# Patient Record
Sex: MALE
[De-identification: names, ages, dates, MRNs, and addresses within clinical notes are randomized per-mention and may not be internally consistent; named-entity substitution may affect disease eponyms.]

---

## 2018-09-11 ENCOUNTER — HOSPITAL ENCOUNTER (INPATIENT)
Dept: HOSPITAL 31 - C.ER | Age: 83
LOS: 3 days | Discharge: HOME | DRG: 639 | End: 2018-09-14
Attending: INTERNAL MEDICINE | Admitting: INTERNAL MEDICINE
Payer: COMMERCIAL

## 2018-09-11 VITALS — BODY MASS INDEX: 25.5 KG/M2

## 2018-09-11 DIAGNOSIS — Z87.891: ICD-10-CM

## 2018-09-11 DIAGNOSIS — I10: ICD-10-CM

## 2018-09-11 DIAGNOSIS — E11.641: Primary | ICD-10-CM

## 2018-09-11 DIAGNOSIS — Z98.890: ICD-10-CM

## 2018-09-11 DIAGNOSIS — N40.0: ICD-10-CM

## 2018-09-11 DIAGNOSIS — Z79.4: ICD-10-CM

## 2018-09-11 DIAGNOSIS — I44.0: ICD-10-CM

## 2018-09-11 LAB
ALBUMIN SERPL-MCNC: 3.7 G/DL (ref 3.5–5)
ALBUMIN/GLOB SERPL: 1.3 {RATIO} (ref 1–2.1)
ALT SERPL-CCNC: 23 U/L (ref 21–72)
APTT BLD: 30 SECONDS (ref 21–34)
AST SERPL-CCNC: 16 U/L (ref 17–59)
BASE EXCESS BLDV CALC-SCNC: 1.9 MMOL/L (ref 0–2)
BASOPHILS # BLD AUTO: 0.1 K/UL (ref 0–0.2)
BASOPHILS NFR BLD: 0.7 % (ref 0–2)
BILIRUB UR-MCNC: NEGATIVE MG/DL
BNP SERPL-MCNC: 640 PG/ML (ref 0–900)
BUN SERPL-MCNC: 20 MG/DL (ref 9–20)
CALCIUM SERPL-MCNC: 9 MG/DL (ref 8.6–10.4)
CK MB SERPL-MCNC: 1.67 NG/ML (ref 0–3.38)
EOSINOPHIL # BLD AUTO: 0.2 K/UL (ref 0–0.7)
EOSINOPHIL NFR BLD: 1.4 % (ref 0–4)
ERYTHROCYTE [DISTWIDTH] IN BLOOD BY AUTOMATED COUNT: 14.8 % (ref 11.5–14.5)
GFR NON-AFRICAN AMERICAN: > 60
GLUCOSE UR STRIP-MCNC: (no result) MG/DL
HGB BLD-MCNC: 10.4 G/DL (ref 12–18)
INR PPP: 1
LEUKOCYTE ESTERASE UR-ACNC: (no result) LEU/UL
LYMPHOCYTES # BLD AUTO: 1.4 K/UL (ref 1–4.3)
LYMPHOCYTES NFR BLD AUTO: 11.3 % (ref 20–40)
MCH RBC QN AUTO: 29.4 PG (ref 27–31)
MCHC RBC AUTO-ENTMCNC: 33.2 G/DL (ref 33–37)
MCV RBC AUTO: 88.5 FL (ref 80–94)
MONOCYTES # BLD: 1 K/UL (ref 0–0.8)
MONOCYTES NFR BLD: 8 % (ref 0–10)
NEUTROPHILS # BLD: 10.1 K/UL (ref 1.8–7)
NEUTROPHILS NFR BLD AUTO: 78.6 % (ref 50–75)
NRBC BLD AUTO-RTO: 0 % (ref 0–2)
PCO2 BLDV: 55 MMHG (ref 40–60)
PH BLDV: 7.33 [PH] (ref 7.32–7.43)
PH UR STRIP: 5 [PH] (ref 5–8)
PLATELET # BLD: 260 K/UL (ref 130–400)
PMV BLD AUTO: 8.5 FL (ref 7.2–11.7)
PROT UR STRIP-MCNC: NEGATIVE MG/DL
PROTHROMBIN TIME: 11.4 SECONDS (ref 9.7–12.2)
RBC # BLD AUTO: 3.53 MIL/UL (ref 4.4–5.9)
RBC # UR STRIP: NEGATIVE /UL
SP GR UR STRIP: 1.01 (ref 1–1.03)
SQUAMOUS EPITHIAL: < 1 /HPF (ref 0–5)
TROPONIN I SERPL-MCNC: 0.02 NG/ML (ref 0–0.12)
UROBILINOGEN UR-MCNC: NORMAL MG/DL (ref 0.2–1)
VENOUS BLOOD GAS PO2: 23 MM/HG (ref 30–55)
WBC # BLD AUTO: 12.8 K/UL (ref 4.8–10.8)

## 2018-09-11 NOTE — C.PDOC
History Of Present Illness


86 y/o male, w/PMhx of HTN, diabetes, and recent left hip fracture s/p ORIF,  

BIBA for evaluation of a syncopal episode developed PTA. As per family members, 

patient woke up in the morning and was complaining of feeling weak. Pt went to 

do some activities when he sustained a syncopal episode. On arrival of ambulance

, patient had a fingerstick less than 30. Presently, patient denies having 

active physical complaints. Patient appears awake, slightly confused, and not 

in any apparent disease.


Time Seen by Provider: 09/11/18 12:14


Chief Complaint (Nursing): Altered Mental Status


History Per: Patient, Family


History/Exam Limitations: None


Onset/Duration Of Symptoms: Days


Current Symptoms Are (Timing): Still Present





Past Medical History


Reviewed: Historical Data, Nursing Documentation, Vital Signs


Vital Signs: 


 Last Vital Signs











Temp  98 F   09/13/18 05:57


 


Pulse  57 L  09/13/18 05:57


 


Resp  20   09/13/18 05:57


 


BP  142/57 L  09/13/18 05:57


 


Pulse Ox  96   09/13/18 05:57














- Medical History


PMH: HTN (AS PER PATIENT'S SON)


Other Surgeries: Hx of surgeries


Family History: States: No Known Family Hx





- Social History


Hx Alcohol Use: No


Hx Substance Use: No





- Immunization History


Hx Tetanus Toxoid Vaccination: No


Hx Influenza Vaccination: No


Hx Pneumococcal Vaccination: No





Review Of Systems


Except As Marked, All Systems Reviewed And Found Negative.


Constitutional: Negative for: Fever, Chills


Neurological: Positive for: Other (syncope).  Negative for: Weakness, Numbness





Physical Exam





- Physical Exam


Appears: No Acute Distress, Other (awake, slightly confused)


Skin: Normal Color, Warm, Dry


Head: Atraumatic, Normacephalic


Eye(s): bilateral: Normal Inspection


Nose: Normal


Oral Mucosa: Moist


Neck: Supple


Chest: Symmetrical


Cardiovascular: Rhythm Regular, Murmur (pansystolic murmur 3/6)


Respiratory: Normal Breath Sounds, No Rales, No Rhonchi, No Wheezing


Gastrointestinal/Abdominal: Normal Exam, Soft, No Tenderness, No Guarding, No 

Rebound


Extremity: Normal ROM, Other (left lower leg - non pitting edema)


Neurological/Psych: Oriented x3, Normal Speech





ED Course And Treatment





- Laboratory Results


Result Diagrams: 


 09/12/18 06:18





 09/12/18 06:16


ECG: Interpreted By Me, Viewed By Me


ECG Interpretation: Normal


Interpretation Of ECG: Sinus rhythm with 1st degree AV block.  Otherwise normal 

ECG


O2 Sat by Pulse Oximetry: 100 (RA)


Pulse Ox Interpretation: Normal





- Radiology


CXR: Interpreted by Me, Viewed By Me





- CT Scan/US


  ** Ct head w/o contrast


Other Rad Studies (CT/US): Radiology Report Reviewed


CT/US Interpretation: IMPRESSION:  No acute intracranial hemorrhage.  Moderate 

patchy deep and subcortical white matter ischemic changes both cerebral 

hemispheres.  There also mild diffuse/confluent chronic periventricular white 

matter ischemic changes with scattered chronic bilateral basal nuclei lacunar 

type infarcts.  Moderate generalized volume loss.  .





  ** Doppler B/L


Other Rad Studies (CT/US): Radiology Report Reviewed


CT/US Interpretation: (-) DVT of B/L lEs


Progress Note: Pt remained stable during [facility] ED evaluation.  case discussed 

with  and admission rranged to tele with Dx: Syncope





Disposition





- Disposition


Disposition: HOSPITALIZED


Disposition Time: 14:01


Condition: STABLE





- Clinical Impression


Clinical Impression: 


 Syncope








- PA / NP / Resident Statement


MD/DO has reviewed & agrees with the documentation as recorded.





- Scribe Statement


The provider has reviewed the documentation as recorded by the Jorge Looney





Provider Attestation





All medical record entries made by the Scribe were at my direction and 

personally dictated by me. I have reviewed the chart and agree that the record 

accurately reflects my personal performance of the history, physical exam, 

medical decision making, and the department course for this patient. I have 

also personally directed, reviewed, and agree with the discharge instructions 

and disposition.

## 2018-09-11 NOTE — RAD
Date of service: 



09/11/2018



HISTORY:

Sepsis Patient  



COMPARISON:

No prior. 



FINDINGS:



LUNGS:

No consolidation.



Trace linear bandlike faint opacities left hemidiaphragm/left lung 

base - subsegmental discoid atelectasis -and or scarring/fibrotic 

changes here considerations-chronicity unknown.



PLEURA:

No significant pleural effusion identified, no pneumothorax apparent. 

Additional findings as referenced above regarding the left patricio 

diaphragmatic pleural surface. 



CARDIOVASCULAR:

Probable minimal cardiomegaly. Tortuous thoracic aorta. 

Atherosclerotic vascular calcifications present. 



OSSEOUS STRUCTURES:

Bilateral shoulder arthrosis



VISUALIZED UPPER ABDOMEN:

Normal.



OTHER FINDINGS:

None.



IMPRESSION:

No consolidation.



 Mostly thin bandlike but also trace radiolucent patchy faint opacity 

over left hemidiaphragm - combination subsegmental atelectasis/trace 

linear fibrosis and post inflammatory like changes here are favored. 

No dense air bronchograms consolidation seen. A subtle infiltrate 

cannot be entirely excluded here



Follow-up recommended

## 2018-09-11 NOTE — CP.PCM.HP
Past Patient History





- Past Social History


Smoking Status: Never Smoked





- CARDIAC


Hx Hypertension: Yes (AS PER PATIENT'S SON)





- ENDOCRINE/METABOLIC


Hx Diabetes Mellitus Type 2: Yes (AS PER PATIENT AND SON)





- PSYCHIATRIC


Hx Substance Use: No





- SURGICAL HISTORY


Hx Surgeries: Yes


Hx Orthopedic Surgery: Yes (RIGHT HIP SURGERY 6 WEEKS AGO AS PER PATIENT'S SON)





- ANESTHESIA


Hx Anesthesia: Yes





Meds


Allergies/Adverse Reactions: 


 Allergies











Allergy/AdvReac Type Severity Reaction Status Date / Time


 


No Known Allergies Allergy   Unverified 09/11/18 11:55














Results





- Vital Signs


Recent Vital Signs: 





 Last Vital Signs











Temp  97.6 F   09/11/18 14:46


 


Pulse  67   09/11/18 14:46


 


Resp  18   09/11/18 14:46


 


BP  127/56 L  09/11/18 14:46


 


Pulse Ox  100   09/11/18 15:24














- Labs


Result Diagrams: 


 09/11/18 13:12





 09/11/18 13:12


Labs: 





 Laboratory Results - last 24 hr











  09/11/18 09/11/18 09/11/18





  11:48 13:12 13:12


 


WBC   12.8 H 


 


RBC   3.53 L 


 


Hgb   10.4 L 


 


Hct   31.2 L 


 


MCV   88.5 


 


MCH   29.4 


 


MCHC   33.2 


 


RDW   14.8 H 


 


Plt Count   260 


 


MPV   8.5 


 


Neut % (Auto)   78.6 H 


 


Lymph % (Auto)   11.3 L 


 


Mono % (Auto)   8.0 


 


Eos % (Auto)   1.4 


 


Baso % (Auto)   0.7 


 


Neut # (Auto)   10.1 H 


 


Lymph # (Auto)   1.4 


 


Mono # (Auto)   1.0 H 


 


Eos # (Auto)   0.2 


 


Baso # (Auto)   0.1 


 


PT    11.4


 


INR    1.0


 


APTT    30


 


pO2   


 


VBG pH   


 


VBG pCO2   


 


VBG HCO3   


 


VBG Total CO2   


 


VBG O2 Sat (Calc)   


 


VBG Base Excess   


 


VBG Potassium   


 


Glucose   


 


Lactate   


 


Sodium   


 


Potassium   


 


Chloride   


 


Carbon Dioxide   


 


Anion Gap   


 


BUN   


 


Creatinine   


 


Est GFR ( Amer)   


 


Est GFR (Non-Af Amer)   


 


POC Glucose (mg/dL)  108  


 


Random Glucose   


 


Calcium   


 


Phosphorus   


 


Magnesium   


 


Total Bilirubin   


 


AST   


 


ALT   


 


Alkaline Phosphatase   


 


Total Creatine Kinase   


 


Troponin I   


 


NT-Pro-B Natriuret Pep   


 


Total Protein   


 


Albumin   


 


Globulin   


 


Albumin/Globulin Ratio   


 


Venous Blood Potassium   


 


Urine Color   


 


Urine Clarity   


 


Urine pH   


 


Ur Specific Gravity   


 


Urine Protein   


 


Urine Glucose (UA)   


 


Urine Ketones   


 


Urine Blood   


 


Urine Nitrate   


 


Urine Bilirubin   


 


Urine Urobilinogen   


 


Ur Leukocyte Esterase   


 


Urine WBC (Auto)   


 


Ur Squamous Epith Cells   














  09/11/18 09/11/18 09/11/18





  13:12 13:21 15:01


 


WBC   


 


RBC   


 


Hgb   


 


Hct   


 


MCV   


 


MCH   


 


MCHC   


 


RDW   


 


Plt Count   


 


MPV   


 


Neut % (Auto)   


 


Lymph % (Auto)   


 


Mono % (Auto)   


 


Eos % (Auto)   


 


Baso % (Auto)   


 


Neut # (Auto)   


 


Lymph # (Auto)   


 


Mono # (Auto)   


 


Eos # (Auto)   


 


Baso # (Auto)   


 


PT   


 


INR   


 


APTT   


 


pO2   23 L 


 


VBG pH   7.33 


 


VBG pCO2   55 


 


VBG HCO3   24.7 


 


VBG Total CO2   30.7 H 


 


VBG O2 Sat (Calc)   38.6 L 


 


VBG Base Excess   1.9 


 


VBG Potassium   3.8 


 


Glucose   76 


 


Lactate   2.3 H 


 


Sodium  143  139.0 


 


Potassium  4.1  


 


Chloride  101  104.0 


 


Carbon Dioxide  29  


 


Anion Gap  16  


 


BUN  20  


 


Creatinine  0.6 L  


 


Est GFR ( Amer)  > 60  


 


Est GFR (Non-Af Amer)  > 60  


 


POC Glucose (mg/dL)    41 L


 


Random Glucose  82  


 


Calcium  9.0  


 


Phosphorus  3.8  


 


Magnesium  1.5 L  


 


Total Bilirubin  0.3  


 


AST  16 L  


 


ALT  23  


 


Alkaline Phosphatase  150 H  


 


Total Creatine Kinase  66  


 


Troponin I  0.0180  


 


NT-Pro-B Natriuret Pep  640  


 


Total Protein  6.5  


 


Albumin  3.7  


 


Globulin  2.8  


 


Albumin/Globulin Ratio  1.3  


 


Venous Blood Potassium   3.8 


 


Urine Color   


 


Urine Clarity   


 


Urine pH   


 


Ur Specific Gravity   


 


Urine Protein   


 


Urine Glucose (UA)   


 


Urine Ketones   


 


Urine Blood   


 


Urine Nitrate   


 


Urine Bilirubin   


 


Urine Urobilinogen   


 


Ur Leukocyte Esterase   


 


Urine WBC (Auto)   


 


Ur Squamous Epith Cells   














  09/11/18 09/11/18 09/11/18





  15:02 15:30 15:45


 


WBC   


 


RBC   


 


Hgb   


 


Hct   


 


MCV   


 


MCH   


 


MCHC   


 


RDW   


 


Plt Count   


 


MPV   


 


Neut % (Auto)   


 


Lymph % (Auto)   


 


Mono % (Auto)   


 


Eos % (Auto)   


 


Baso % (Auto)   


 


Neut # (Auto)   


 


Lymph # (Auto)   


 


Mono # (Auto)   


 


Eos # (Auto)   


 


Baso # (Auto)   


 


PT   


 


INR   


 


APTT   


 


pO2   


 


VBG pH   


 


VBG pCO2   


 


VBG HCO3   


 


VBG Total CO2   


 


VBG O2 Sat (Calc)   


 


VBG Base Excess   


 


VBG Potassium   


 


Glucose   


 


Lactate   


 


Sodium   


 


Potassium   


 


Chloride   


 


Carbon Dioxide   


 


Anion Gap   


 


BUN   


 


Creatinine   


 


Est GFR ( Amer)   


 


Est GFR (Non-Af Amer)   


 


POC Glucose (mg/dL)  37 L*   156 H


 


Random Glucose   


 


Calcium   


 


Phosphorus   


 


Magnesium   


 


Total Bilirubin   


 


AST   


 


ALT   


 


Alkaline Phosphatase   


 


Total Creatine Kinase   


 


Troponin I   


 


NT-Pro-B Natriuret Pep   


 


Total Protein   


 


Albumin   


 


Globulin   


 


Albumin/Globulin Ratio   


 


Venous Blood Potassium   


 


Urine Color   Yellow 


 


Urine Clarity   Clear 


 


Urine pH   5.0 


 


Ur Specific Gravity   1.014 


 


Urine Protein   Negative 


 


Urine Glucose (UA)   1+ H 


 


Urine Ketones   Negative 


 


Urine Blood   Negative 


 


Urine Nitrate   Negative 


 


Urine Bilirubin   Negative 


 


Urine Urobilinogen   Normal 


 


Ur Leukocyte Esterase   Neg 


 


Urine WBC (Auto)   1 


 


Ur Squamous Epith Cells   < 1

## 2018-09-11 NOTE — CT
Date of service: 



09/11/2018



PROCEDURE:  CT HEAD WITHOUT CONTRAST.



HISTORY:

AMS



COMPARISON:

None available.



TECHNIQUE:

Axial computed tomography images were obtained through the head/brain 

without intravenous contrast.  



Radiation dose:



Total exam DLP = 1122.35 mGy-cm.



This CT exam was performed using one or more of the following dose 

reduction techniques: Automated exposure control, adjustment of the 

mA and/or kV according to patient size, and/or use of iterative 

reconstruction technique.



FINDINGS:



HEMORRHAGE:

No acute parenchymal, subarachnoid nor extra-axial hemorrhage. 



BRAIN:

Moderate patchy deep and subcortical white matter ischemic changes 

throughout both cerebral hemispheres.  Additionally, there are mild 

diffuse-confluent chronic periventricular white matter ischemic 

changes scattered chronic bilateral basal nuclei lacunar type 

infarcts also present. .  Note that the possibility of a small 

hyperacute infarct cannot be excluded on this exam.  



No obvious parenchymal nor extra-axial mass or collection seen on 

this noncontrast study. 



Moderate generalized volume loss. 



Vascular calcifications both carotid siphons and vertebral arteries. 



VENTRICLES:

No obstructive hydrocephalus. 



CALVARIUM:

Unremarkable.



PARANASAL SINUSES:

Minor mucosal thickening seen within a few ethmoid air cells.



MASTOID AIR CELLS:

Unremarkable as visualized. No inflammatory changes.



OTHER FINDINGS:

None.



IMPRESSION:

No acute intracranial hemorrhage. 



Moderate patchy deep and subcortical white matter ischemic changes 

both cerebral hemispheres.  There also mild diffuse/confluent chronic 

periventricular white matter ischemic changes with scattered chronic 

bilateral basal nuclei lacunar type infarcts. 



Moderate generalized volume loss.

## 2018-09-12 LAB
ALBUMIN SERPL-MCNC: 3.4 G/DL (ref 3.5–5)
ALBUMIN/GLOB SERPL: 1.3 {RATIO} (ref 1–2.1)
ALT SERPL-CCNC: 27 U/L (ref 21–72)
AST SERPL-CCNC: 15 U/L (ref 17–59)
BASOPHILS # BLD AUTO: 0.1 K/UL (ref 0–0.2)
BASOPHILS NFR BLD: 0.7 % (ref 0–2)
BUN SERPL-MCNC: 16 MG/DL (ref 9–20)
CALCIUM SERPL-MCNC: 8.8 MG/DL (ref 8.6–10.4)
CK MB SERPL-MCNC: 1.4 NG/ML (ref 0–3.38)
EOSINOPHIL # BLD AUTO: 0.5 K/UL (ref 0–0.7)
EOSINOPHIL NFR BLD: 5 % (ref 0–4)
ERYTHROCYTE [DISTWIDTH] IN BLOOD BY AUTOMATED COUNT: 15.1 % (ref 11.5–14.5)
GFR NON-AFRICAN AMERICAN: > 60
HGB BLD-MCNC: 10.5 G/DL (ref 12–18)
LYMPHOCYTES # BLD AUTO: 2.5 K/UL (ref 1–4.3)
LYMPHOCYTES NFR BLD AUTO: 23.9 % (ref 20–40)
MCH RBC QN AUTO: 29.1 PG (ref 27–31)
MCHC RBC AUTO-ENTMCNC: 33 G/DL (ref 33–37)
MCV RBC AUTO: 88.3 FL (ref 80–94)
MONOCYTES # BLD: 1.1 K/UL (ref 0–0.8)
MONOCYTES NFR BLD: 10.6 % (ref 0–10)
NEUTROPHILS # BLD: 6.2 K/UL (ref 1.8–7)
NEUTROPHILS NFR BLD AUTO: 59.8 % (ref 50–75)
NRBC BLD AUTO-RTO: 0 % (ref 0–2)
PLATELET # BLD: 253 K/UL (ref 130–400)
PMV BLD AUTO: 8.7 FL (ref 7.2–11.7)
RBC # BLD AUTO: 3.61 MIL/UL (ref 4.4–5.9)
WBC # BLD AUTO: 10.4 K/UL (ref 4.8–10.8)

## 2018-09-12 NOTE — CARD
--------------- APPROVED REPORT --------------





Date of service: 09/11/2018



EKG Measurement

Heart [id number]

CT 248P54

[id number]

[id number]

[id number]



<Conclusion>

Sinus rhythm with 1st degree AV block

Otherwise normal ECG

## 2018-09-12 NOTE — CP.PCM.PN
Subjective





- Date & Time of Evaluation


Date of Evaluation: 09/12/18


Time of Evaluation: 10:00





Objective





- Vital Signs/Intake and Output


Vital Signs (last 24 hours): 


 











Temp Pulse Resp BP Pulse Ox


 


 98.1 F   64   17   111/71   99 


 


 09/12/18 16:00  09/12/18 19:36  09/12/18 16:00  09/12/18 16:00  09/12/18 16:00











- Medications


Medications: 


 Current Medications





Amlodipine Besylate (Norvasc)  5 mg PO DAILY [facility]


   Last Admin: 09/12/18 09:30 Dose:  5 mg


Gabapentin (Neurontin)  100 mg PO BID [facility]


   Last Admin: 09/12/18 09:30 Dose:  100 mg


Rivaroxaban (Xarelto)  10 mg PO DAILY [facility]


   Last Admin: 09/12/18 09:30 Dose:  10 mg


Tamsulosin HCl (Flomax)  0.4 mg PO DAILY [facility]


   Last Admin: 09/12/18 09:30 Dose:  0.4 mg











- Labs


Labs: 


 





 09/12/18 06:18 





 09/12/18 06:16 





 











PT  11.4 SECONDS (9.7-12.2)   09/11/18  13:12    


 


INR  1.0   09/11/18  13:12    


 


APTT  30 SECONDS (21-34)   09/11/18  13:12

## 2018-09-12 NOTE — VASCLAB
Date of service: 



09/11/2018



PROCEDURE:  Lower Extremity Venous Duplex Exam.



HISTORY:

legs swelling, syncope 



PRIORS:

None. 



TECHNIQUE:

Bilateral common femoral, femoral, popliteal and posterior tibial, 

peroneal and great saphenous veins were evaluated. Flow was assessed 

with color Doppler, compressibility, assessment of phasic flow and 

augmentation response.



Report prepared by   William Acosta, BS, RVT



FINDINGS:



RIGHT:

1. Common Femoral Vein: 



1.1. Compressibility - Fully compressible: Thrombus -  None : Flow - 

Phasic: Augmentation -Normal: Reflux - None.



2. Femoral Vein:



2.1. Compressibility - Fully compressible: Thrombus -  None : Flow - 

Phasic: Augmentation -Normal: Reflux - None.



3. Popliteal Vein: 



3.1. Compressibility - Fully compressible: Thrombus - None :  Flow - 

Phasic: Augmentation -Normal: Reflux - None.



4. Posterior Tibial Vein: 



4.1. Compressibility - Fully compressible: Thrombus -  None: Flow - 

Phasic: Augmentation -Normal: Reflux - None.



5. Peroneal Vein:



5.1. Compressibility - Fully compressible: Thrombus -  None: Flow - 

Phasic: Augmentation -Normal: Reflux - None.



6. Great Saphenous Vein:

6.1. Compressibility - Fully compressible: Thrombus - None: Flow - 

Phasic: Augmentation - Normal: Reflux - None.





LEFT:

1. Common Femoral Vein:



1.1.  Compressibility - Fully compressible: Thrombus -  None: Flow - 

Phasic: Augmentation -Normal: Reflux - None.



2. Femoral Vein:



2.1.  Compressibility - Fully compressible: Thrombus -  None: Flow - 

Phasic: Augmentation -Normal: Reflux - None.



3. Popliteal Vein:



3.1.  Compressibility - Fully compressible: Thrombus -  None : Flow - 

Phasic: Augmentation -Normal: Reflux - None.



4. Posterior Tibial Vein:



4.1.  Compressibility - Fully compressible: Thrombus -  None: Flow - 

Phasic: Augmentation -Normal: Reflux - None.



5. Peroneal Vein:



5.1.  Compressibility - Fully compressible: Thrombus -  None: Flow - 

Phasic: Augmentation -Normal: Reflux - None.



6. Great Saphenous Vein:

6.1.  Compressibility - Fully compressible: Thrombus -  None: Flow - 

Phasic: Augmentation - Normal: Reflux - None.





OTHER FINDINGS:  Right: None significant.



Left: None significant.



IMPRESSION:

Right: 



No evidence of deep or superficial vein thrombosis of the right lower 

extremity. Normal valve function noted of the right side.     



Left: 



No evidence of deep or superficial vein thrombosis of the left lower 

extremity. Normal valve function noted of the left side.

## 2018-09-13 VITALS — RESPIRATION RATE: 20 BRPM

## 2018-09-13 NOTE — CP.PCM.PN
Subjective





- Date & Time of Evaluation


Date of Evaluation: 09/13/18


Time of Evaluation: 11:15





- Subjective


Subjective: 


clinically same





Objective





- Vital Signs/Intake and Output


Vital Signs (last 24 hours): 


 











Temp Pulse Resp BP Pulse Ox


 


 98 F   57 L  20   142/57 L  100 


 


 09/13/18 05:57  09/13/18 05:57  09/13/18 05:57  09/13/18 05:57  09/13/18 06:21








Intake and Output: 


 











 09/13/18 09/13/18





 06:59 18:59


 


Intake Total 240 


 


Balance 240 














- Medications


Medications: 


 Current Medications





Amlodipine Besylate (Norvasc)  5 mg PO DAILY [facility]


   Last Admin: 09/13/18 09:25 Dose:  5 mg


Gabapentin (Neurontin)  100 mg PO BID [facility]


   Last Admin: 09/13/18 09:25 Dose:  100 mg


Rivaroxaban (Xarelto)  10 mg PO DAILY [facility]


   Last Admin: 09/13/18 09:27 Dose:  10 mg


Tamsulosin HCl (Flomax)  0.4 mg PO DAILY [facility]


   Last Admin: 09/13/18 09:25 Dose:  0.4 mg











- Labs


Labs: 


 





 09/12/18 06:18 





 09/12/18 06:16 





 











PT  11.4 SECONDS (9.7-12.2)   09/11/18  13:12    


 


INR  1.0   09/11/18  13:12    


 


APTT  30 SECONDS (21-34)   09/11/18  13:12

## 2018-09-13 NOTE — CP.PCM.CON
History of Present Illness





- History of Present Illness


History of Present Illness: 





88 y/o male, w/PMhx of HTN, diabetes, and recent left hip fracture s/p ORIF,  

BIBA for evaluation of a syncopal episode developed PTA. As per family members, 

patient woke up in the morning and was complaining of feeling weak. Pt was 

attending a '9/11' mass when he sustained a syncopal episode. 


> claims had a full breakfast in AM





Recovering from hip surgery, ambulates with walker.  Denies CP but states mild 

SOb with exertion.


No fevers, chills ro cough


No palpitation or dizziness





Cardiac HX: No MI/coronary intervention or CABG


PMHX: HTn chronic labile; DM chronic labile, Hip surgery: recent stable





Review of Systems





- Review of Systems


All systems: reviewed and no additional remarkable complaints except





Past Patient History





- Past Medical History & Family History


Past Medical History?: Yes





- Past Social History


Smoking Status: Never Smoked





- CARDIAC


Hx Hypertension: Yes (AS PER PATIENT'S SON)





- ENDOCRINE/METABOLIC


Hx Diabetes Mellitus Type 2: Yes





- MUSCULOSKELETAL/RHEUMATOLOGICAL


Hx Falls: Yes





- PSYCHIATRIC


Hx Substance Use: No





- SURGICAL HISTORY


Hx Surgeries: Yes


Hx Orthopedic Surgery: Yes (RIGHT HIP SURGERY 6 WEEKS AGO AS PER PATIENT'S SON)





- ANESTHESIA


Hx Anesthesia: Yes


Hx Anesthesia Reactions: No


Hx Malignant Hyperthermia: No


Has any member of the family had a problem w/ anesthesia?: No





Meds


Allergies/Adverse Reactions: 


 Allergies











Allergy/AdvReac Type Severity Reaction Status Date / Time


 


No Known Allergies Allergy   Unverified 09/11/18 11:55














- Medications


Medications: 


 Current Medications





Amlodipine Besylate (Norvasc)  5 mg PO DAILY [facility]


   Last Admin: 09/13/18 09:25 Dose:  5 mg


Gabapentin (Neurontin)  100 mg PO BID [facility]


   Last Admin: 09/13/18 09:25 Dose:  100 mg


Rivaroxaban (Xarelto)  10 mg PO DAILY [facility]


   Last Admin: 09/13/18 09:27 Dose:  10 mg


Tamsulosin HCl (Flomax)  0.4 mg PO DAILY [facility]


   Last Admin: 09/13/18 09:25 Dose:  0.4 mg











Physical Exam





- Constitutional


Appears: No Acute Distress





- Head Exam


Head Exam: ATRAUMATIC, NORMAL INSPECTION, NORMOCEPHALIC





- Eye Exam


Eye Exam: EOMI, Normal appearance, PERRL





- ENT Exam


ENT Exam: Mucous Membranes Moist, Normal Oropharynx





- Neck Exam


Neck exam: Positive for: Normal Inspection





- Respiratory Exam


Respiratory Exam: Clear to Auscultation Bilateral, NORMAL BREATHING PATTERN.  

absent: Rhonchi, Wheezes





- Cardiovascular Exam


Cardiovascular Exam: REGULAR RHYTHM, +S1, +S2, Systolic Murmur (3/6 LONNY with a 

soft A2, mild radiation to carotids)





- GI/Abdominal Exam


GI & Abdominal Exam: Normal Bowel Sounds, Soft.  absent: Tenderness





- Extremities Exam


Extremities exam: Positive for: normal inspection.  Negative for: calf 

tenderness, pedal edema





- Neurological Exam


Neurological exam: Alert, CN II-XII Intact, Oriented x3





- Psychiatric Exam


Psychiatric exam: Normal Affect, Normal Mood





- Skin


Skin Exam: Normal Color, Warm





Results





- Vital Signs


Recent Vital Signs: 


 Last Vital Signs











Temp  98 F   09/13/18 10:57


 


Pulse  63   09/13/18 10:57


 


Resp  18   09/13/18 10:57


 


BP  157/74 H  09/13/18 10:57


 


Pulse Ox  97   09/13/18 10:57














- Labs


Result Diagrams: 


 09/12/18 06:18





 09/12/18 06:16


Labs: 


 Laboratory Results - last 24 hr











  09/12/18 09/12/18 09/12/18





  07:32 11:11 16:32


 


POC Glucose (mg/dL)  151 H  228 H  228 H














  09/12/18 09/13/18 09/13/18





  21:30 06:06 11:57


 


POC Glucose (mg/dL)  195 H  137 H  270 H














Assessment & Plan





- Assessment and Plan (Free Text)


Assessment: 





Syncope


> likely induced by hypoglycemia; sx's improved


> supportive care and adjust and eval DM meds


> CT head: chronic changes no acute pathology


> Neuro f/u 





Cardiac Murmur


> typical of AS atleast moderate by exam: f/u echo to eval


> EKG: NSR with 1st deg AVB: directly viewed by me


> Trop neg x2


> No ADHF or active ischemia or angina


> suggest ASA and statin





HTN


> Mild


> cont amlodipine


> monitor for now

## 2018-09-13 NOTE — CP.PCM.CON
History of Present Illness





- History of Present Illness


History of Present Illness: 


PGY-2 Neurology Consult for Dr. Ng





CC - syncope





Patient is a 86 yo male with PMHx of HTN, DM2, BPH, and right hip fracture s/p 

ORIF 6 weeks ago, who presented with a syncopal episode.  EMS personal measured 

blood glucose to be 30 and he was given dextrose. He stated he ate a normal 

full breakfast. Patient states that on the morning of admission date he was 

attending a memorial service when after walking to his car seat and sitting 

down he suddenly lost consciousness. He states that there were many witnesses 

though he is unsure if he was convulsing or exactly how long he lost 

consciousness. The next thing he remembers was waking to EMS personal.   While 

in ED, CT scan showed no acute ICH and EKG showed 1st degree av block. Patient 

states that he had a similar loss of consciousness 6 weeks ago.  He is 

compliant with his daily medications and checks his blood glucose daily which 

ranges around 150-200 usually.  At the time of exam he denies headache, blurry 

vision, dizziness, lightheadedness, numbness, weakness, paresthesias.





PMH- as above


PSH- ORIF of right hip 6 weeks ago


Medications- Metformin, Flomax, Lantus 24u, Norvasc


Allergies- NKDA


ROS- as above


Family- no family history of seizures


social- wife passed away 2 years ago, 3 adult children.  Former smoker and 

former alcohol use but quit many years ago.  Retired .  Since the 

original fall he uses a walker for ambulation but prior to that he ambulated 

independently without problems.








Review of Systems





- Constitutional


Constitutional: absent: Chills, Fever





- EENT


Eyes: absent: Blurred Vision


Ears: Abnormal Hearing





- Cardiovascular


Cardiovascular: absent: Chest Pain





- Respiratory


Respiratory: absent: Cough, Wheezing





- Gastrointestinal


Gastrointestinal: absent: Abdominal Pain, Diarrhea, Nausea





- Genitourinary


Genitourinary: absent: Dysuria





- Musculoskeletal


Musculoskeletal: Back Pain





- Neurological


Neurological: Abnormal Hearing.  absent: Confusion, Convulsions, Dizziness, 

Numbness, Focal Weakness, Sensory Deficit, Vertigo, Weakness





Past Patient History





- Past Medical History & Family History


Past Medical History?: Yes





- Past Social History


Smoking Status: Never Smoked





- CARDIAC


Hx Hypertension: Yes (AS PER PATIENT'S SON)





- ENDOCRINE/METABOLIC


Hx Diabetes Mellitus Type 2: Yes





- MUSCULOSKELETAL/RHEUMATOLOGICAL


Hx Falls: Yes





- PSYCHIATRIC


Hx Substance Use: No





- SURGICAL HISTORY


Hx Surgeries: Yes


Hx Orthopedic Surgery: Yes (RIGHT HIP SURGERY 6 WEEKS AGO AS PER PATIENT'S SON)





- ANESTHESIA


Hx Anesthesia: Yes


Hx Anesthesia Reactions: No


Hx Malignant Hyperthermia: No


Has any member of the family had a problem w/ anesthesia?: No





Meds


Allergies/Adverse Reactions: 


 Allergies











Allergy/AdvReac Type Severity Reaction Status Date / Time


 


No Known Allergies Allergy   Unverified 09/11/18 11:55














- Medications


Medications: 


 Current Medications





Amlodipine Besylate (Norvasc)  5 mg PO DAILY [facility]


   Last Admin: 09/13/18 09:25 Dose:  5 mg


Gabapentin (Neurontin)  100 mg PO BID [facility]


   Last Admin: 09/13/18 17:03 Dose:  100 mg


Rivaroxaban (Xarelto)  10 mg PO DAILY [facility]


   Last Admin: 09/13/18 09:27 Dose:  10 mg


Tamsulosin HCl (Flomax)  0.4 mg PO DAILY [facility]


   Last Admin: 09/13/18 09:25 Dose:  0.4 mg











Physical Exam





- Constitutional


Appears: Well, Non-toxic, No Acute Distress





- Head Exam


Head Exam: ATRAUMATIC, NORMAL INSPECTION





- Eye Exam


Eye Exam: EOMI, PERRL.  absent: Nystagmus





- ENT Exam


ENT Exam: Mucous Membranes Moist





- Respiratory Exam


Respiratory Exam: Clear to Auscultation Bilateral, NORMAL BREATHING PATTERN.  

absent: Rales, Rhonchi, Wheezes





- Cardiovascular Exam


Cardiovascular Exam: REGULAR RHYTHM.  absent: Tachycardia





- GI/Abdominal Exam


GI & Abdominal Exam: Normal Bowel Sounds, Soft.  absent: Tenderness





- Extremities Exam


Extremities exam: Positive for: normal capillary refill, normal inspection.  

Negative for: pedal edema, pedal pulses present





- Neurological Exam


Neurological exam: Alert, CN II-XII Intact, Normal Gait (patient with antalgic 

gait 2/2 to recent hip surgery), Oriented x3, Reflexes Normal





- Psychiatric Exam


Psychiatric exam: Normal Affect, Normal Mood





- Skin


Skin Exam: Normal Color, Warm





Results





- Vital Signs


Recent Vital Signs: 


 Last Vital Signs











Temp  97.5 F L  09/13/18 15:00


 


Pulse  68   09/13/18 15:00


 


Resp  20   09/13/18 15:00


 


BP  125/66   09/13/18 15:00


 


Pulse Ox  99   09/13/18 15:00














- Labs


Result Diagrams: 


 09/12/18 06:18





 09/12/18 06:16


Labs: 


 Laboratory Results - last 24 hr











  09/12/18 09/13/18 09/13/18





  21:30 06:06 11:57


 


POC Glucose (mg/dL)  195 H  137 H  270 H














Assessment & Plan





- Assessment and Plan (Free Text)


Plan: 


Patient is a 86 yo male with PMHx of HTN, DM2, BPH, and right hip fracture s/p 

ORIF 6 weeks ago, who presented with a syncopal episode:





Syncope


-Likely 2/2 to hypoglycemia, EMS found blood glucose to be 30, in our ED blood 

glucose at one point was 37, he also had recent changes to his insulin regimen


-CT head w/o contrast:


* No acute intracranial hemorrhage. Moderate patchy deep and subcortical white 

matter ischemic changes both cerebral hemispheres.  There also mild diffuse/

confluent chronic periventricular white matter ischemic changes with scattered 

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 Moderate generalized 

volume loss.


-Venous Dopplers LE: 


* Negative for DVT b/l


-Patient is currently on xaralto 10mg po qd for DVT prophylaxis given he had 

recent hip surgery (hip replacement?)


-Start aspirin 81mg po qd and crestor 10mg po qd 


* also recommended by cardiology


-Patient needs to follow-up with a diabetic specialist who can better optimize 

his insulin regimen





Neurology will sign off





Case discussed with Dr Ng

## 2018-09-14 VITALS — HEART RATE: 60 BPM

## 2018-09-14 VITALS — TEMPERATURE: 98.2 F | SYSTOLIC BLOOD PRESSURE: 123 MMHG | OXYGEN SATURATION: 94 % | DIASTOLIC BLOOD PRESSURE: 70 MMHG

## 2018-09-15 NOTE — CARD
--------------- APPROVED REPORT --------------





Date of service: 09/14/2018



EXAM: Two-dimensional and M-mode echocardiogram with Doppler and 

color Doppler.



Other Information 

Quality : [employer] : 



INDICATION

Syncope AS



RISK FACTORS

Hypertension 

Diabetes



2D DIMENSIONS 

IVSd0.9   (0.7-1.1cm)LVDd5.4   (3.9-5.9cm)

LVOT Diameter2.0   (1.8-2.4cm)PWd1.1   (0.7-1.1cm)

LVDs4.4   (2.5-4.0cm)FS (%) 19.6   %

LVEF (%)51.0   (>50%)



M-Mode DIMENSIONS 

RVDd2.15   (2.1-3.2cm)Left Atrium (MM)3.12   (2.5-4.0cm)

IVSd1.77   (0.7-1.1cm)Aortic Root3.52   (2.2-3.7cm)

LVDd4.27   (4.0-5.6cm)Aortic Cusp Exc.1.39   (1.5-2.0cm)

PWd1.32   (0.7-1.1cm)FS (%) 21   %

LVDs3.37   (2.0-3.8cm)LVEF (%)55   (>50%)



Aortic Valve

AoV Peak Fcsoyxyb724.4cm/sAoV VTI95.0cmAO Peak GR.55mmHg

LVOT Peak Mwlxcvip65.2cm/sLVOT VTI24.33cmAO Mean GR.34mmHg

MEENA (VMAX)0.15ww8LWS (VTI)0.81cm2



Mitral Valve

MV E Muqyrtyn47.7cm/sMV A Vyobtkad528.9cm/sE/A ratio0.6



TDI

E/Lateral E'0.0E/Medial E'0.0



Tricuspid Valve

TR Peak Aqvbdbgk875sd/sTR Peak Gr.24quZuYUKD48vpJu



 LEFT VENTRICLE 

The left ventricle is normal size.

There is normal left ventricular wall thickness.

The left ventricular function is normal.

The left ventricular ejection fraction is within the normal range.

There is normal LV segmental wall motion.

Transmitral Doppler flow pattern is abnormal.



 RIGHT VENTRICLE 

The right ventricle is normal size.



 ATRIA 

The left atrium size is normal.



The right atrium size is normal.



 AORTIC VALVE 

There is severe valvular aortic stenosis.



 MITRAL VALVE 

Mitral regurgitation is trace.



 TRICUSPID VALVE 

There is trace to mild tricuspid regurgitation.



<Conclusion>

Normal LV systolic function.

Diastolic dysfunction.

Normal chamber size.

Trace MR.

Trace to mild TR.

severe Aortic stenosis.